# Patient Record
Sex: FEMALE | Race: WHITE
[De-identification: names, ages, dates, MRNs, and addresses within clinical notes are randomized per-mention and may not be internally consistent; named-entity substitution may affect disease eponyms.]

---

## 2019-03-03 ENCOUNTER — HOSPITAL ENCOUNTER (OUTPATIENT)
Dept: HOSPITAL 97 - ER | Age: 54
Setting detail: OBSERVATION
LOS: 1 days | Discharge: HOME | End: 2019-03-04
Attending: HOSPITALIST | Admitting: HOSPITALIST
Payer: COMMERCIAL

## 2019-03-03 VITALS — BODY MASS INDEX: 22.6 KG/M2

## 2019-03-03 DIAGNOSIS — J45.909: ICD-10-CM

## 2019-03-03 DIAGNOSIS — I10: ICD-10-CM

## 2019-03-03 DIAGNOSIS — K80.00: Primary | ICD-10-CM

## 2019-03-03 DIAGNOSIS — L40.9: ICD-10-CM

## 2019-03-03 PROCEDURE — 83690 ASSAY OF LIPASE: CPT

## 2019-03-03 PROCEDURE — 80076 HEPATIC FUNCTION PANEL: CPT

## 2019-03-03 PROCEDURE — 36415 COLL VENOUS BLD VENIPUNCTURE: CPT

## 2019-03-03 PROCEDURE — 85610 PROTHROMBIN TIME: CPT

## 2019-03-03 PROCEDURE — 84484 ASSAY OF TROPONIN QUANT: CPT

## 2019-03-03 PROCEDURE — 76705 ECHO EXAM OF ABDOMEN: CPT

## 2019-03-03 PROCEDURE — 85025 COMPLETE CBC W/AUTO DIFF WBC: CPT

## 2019-03-03 PROCEDURE — 84145 PROCALCITONIN (PCT): CPT

## 2019-03-03 PROCEDURE — 96375 TX/PRO/DX INJ NEW DRUG ADDON: CPT

## 2019-03-03 PROCEDURE — 84132 ASSAY OF SERUM POTASSIUM: CPT

## 2019-03-03 PROCEDURE — 80048 BASIC METABOLIC PNL TOTAL CA: CPT

## 2019-03-03 PROCEDURE — 85730 THROMBOPLASTIN TIME PARTIAL: CPT

## 2019-03-03 PROCEDURE — 74177 CT ABD & PELVIS W/CONTRAST: CPT

## 2019-03-03 PROCEDURE — 96361 HYDRATE IV INFUSION ADD-ON: CPT

## 2019-03-03 PROCEDURE — 81003 URINALYSIS AUTO W/O SCOPE: CPT

## 2019-03-03 PROCEDURE — 96365 THER/PROPH/DIAG IV INF INIT: CPT

## 2019-03-03 PROCEDURE — 71045 X-RAY EXAM CHEST 1 VIEW: CPT

## 2019-03-03 PROCEDURE — 99285 EMERGENCY DEPT VISIT HI MDM: CPT

## 2019-03-03 NOTE — XMS REPORT
Summary of Care: 17 - 17

 Created on:2102



Patient:WINDY RIZO

Sex:Female

:1965

External Reference #:[id number]





Demographics







 Address  [address]



   [place], [place] [address] Phone  [phone]

 

 Email Address  [email or web address]

 

 Preferred Language  Unknown

 

 Marital Status  

 

 Restorationism Affiliation  Unknown

 

 Race  White/

 

 Ethnic Group  Not  or 









Author







 Organization  [facility]

 

 Address  [address] [place], [place] [address]

 

 Phone  [phone]









Encounter

HQ Encntr_alias(FIN) [id number] Date(s): 17 - 17

[address] [phone]
[phone]

Discharge Disposition: Home or Self Care

Attending Physician: Cristian Torres MD

Referring Physician: Cristian Torres MD





Vital Signs

No data available for this section



Problem List

No data available for this section



Allergies, Adverse Reactions, Alerts

No data available for this section



Medications

No data available for this section



Results

No data available for this section



Immunizations

No data available for this section



Procedures

No data available for this section



Social History

No data available for this section



Assessment and Plan

No data available for this section

## 2019-03-03 NOTE — XMS REPORT
Continuity of Care Document

 Created on:2017



Patient:WINDY RIZO

Sex:Female

:1965

External Reference #:[id number]





Demographics







 Address  [address] [place] [address] 



   [place] [address]

 

 Phone  [phone]

 

 Preferred Language  Unknown

 

 Marital Status  Unknown

 

 Mormonism Affiliation  Unknown

 

 Race  Unknown

 

 Ethnic Group  Unknown









Author







 Organization  [facility]









Problems







 Problem  Status  Onset Date  Classification  Date  Comments  Source



         Reported    



             



             

 

 PAIN  Active  2017         [facility]



             



             



             







Medications







 Medication  Details  Route  Status  Patient  Ordering  Order  Source



         Instructions  Provider  Date  



               



               



               







Allergies, Adverse Reactions, Alerts







 Substance  Category  Reaction  Severity  Reaction  Status  Date  Comments  
Source



         type    Reported    



                 



                 







Immunizations







 Immunization  Date Given  Site  Status  Last Updated  Comments  Source



             



             







Results







 Order  Results  Value  Reference  Date  Interpretation  Comments  Source



 Name      Range        



               



               



               

 

 Chest 2  Chest 2  CHEST TWO  VIEW        -   [facility]



 views  views     -  



 DX              



               



     INDICATION: Clearance for the patient to begin Humira medication for 
psoriasis.        Read by:  Jair Means MD  



             Dictated Date/time:  17 11:54  



             Electronically Signed by:  Jari Means MD                
   17 11:56  



             FINAL REPORT  



     COMPARISON: None.          



               



               



               



     FINDINGS: The lungs are clear. The pleura, cardiomediastinal silhouette 
and bony thorax are normal.          



               



               



               



     IMPRESSION: Negative.          



               



               



               



               



               



     *******END IMPRESSION*******          



               



               



               



               



               



     SL: [id number]          



               



               



               



               







Vital Signs







 Vital Sign  Value  Date  Comments  Source



         







Encounters







 Location  Location  Encounter  Encounter  Reason  Attending  ADM  DC  Status  
Source



   Details  Type  Number  For  Provider  Date  Date    



         Visit          



                   



                   



                   

 

 [facility]    Outpatient  [id number]    Cristian Torres       [facility]



 [facility]    



 Gaurav                  



                   



                   



                   







Procedures







 Procedure  Code  Date  Perfomer  Comments  Source

## 2019-03-04 VITALS — SYSTOLIC BLOOD PRESSURE: 135 MMHG | DIASTOLIC BLOOD PRESSURE: 89 MMHG | TEMPERATURE: 98.9 F

## 2019-03-04 VITALS — OXYGEN SATURATION: 100 %

## 2019-03-04 LAB
ALBUMIN SERPL BCP-MCNC: 3.9 G/DL (ref 3.4–5)
ALP SERPL-CCNC: 66 U/L (ref 45–117)
ALT SERPL W P-5'-P-CCNC: 34 U/L (ref 12–78)
AST SERPL W P-5'-P-CCNC: 24 U/L (ref 15–37)
BUN BLD-MCNC: 14 MG/DL (ref 7–18)
GLUCOSE SERPLBLD-MCNC: 111 MG/DL (ref 74–106)
HCT VFR BLD CALC: 40.5 % (ref 36–45)
INR BLD: 0.99
LIPASE SERPL-CCNC: 116 U/L (ref 73–393)
LYMPHOCYTES # SPEC AUTO: 2.2 K/UL (ref 0.7–4.9)
PMV BLD: 9.1 FL (ref 7.6–11.3)
POTASSIUM SERPL-SCNC: 3.4 MMOL/L (ref 3.5–5.1)
RBC # BLD: 4.48 M/UL (ref 3.86–4.86)
TROPONIN (EMERG DEPT USE ONLY): < 0.02 NG/ML (ref 0–0.04)
UA DIPSTICK W REFLEX MICRO PNL UR: (no result)

## 2019-03-04 RX ADMIN — SODIUM CHLORIDE SCH: 0.9 INJECTION, SOLUTION INTRAVENOUS at 13:00

## 2019-03-04 RX ADMIN — POTASSIUM CHLORIDE SCH: 200 INJECTION, SOLUTION INTRAVENOUS at 10:00

## 2019-03-04 RX ADMIN — SODIUM CHLORIDE SCH MLS: 0.9 INJECTION, SOLUTION INTRAVENOUS at 03:43

## 2019-03-04 RX ADMIN — POTASSIUM CHLORIDE SCH: 200 INJECTION, SOLUTION INTRAVENOUS at 08:00

## 2019-03-04 NOTE — ER
Nurse's Notes                                                                                     

 [facility]                                                                

Name: Vanessa Louis                                                                             

Age: 53 yrs                                                                                       

Sex: Female                                                                                       

: 1965                                                                                   

MRN: [id number]                                                                                   

Arrival Date: 2019                                                                          

Time: 23:05                                                                                       

Account#: [id number]                                                                            

Bed 25                                                                                            

Private MD: Carter Brunson B                                                                     

Diagnosis: Abdominal tenderness;Vomiting;Hypokalemia;Essential (primary) hypertension             

                                                                                                  

Presentation:                                                                                     

                                                                                             

23:15 Presenting complaint: Patient states: she has abdominal pain/cramping radiating to the  mg2 

      back started this 2130H. she ate chicken blue in [facility]. she vomited once on     

      her way her to ed. Transition of care: patient was not received from another setting of     

      care. Onset of symptoms was 2019 at 21:30. Risk Assessment: Do you want to        

      hurt yourself or someone else? Patient reports no desire to harm self or others.            

      Initial Sepsis Screen: Does the patient meet any 2 criteria? No. Patient's initial          

      sepsis screen is negative. Does the patient have a suspected source of infection? No.       

      Patient's initial sepsis screen is negative. Care prior to arrival: None.                   

23:15 Method Of Arrival: Ambulatory                                                           mg2 

23:15 Acuity: JAXON 3                                                                           mg2 

                                                                                                  

Triage Assessment:                                                                                

23:22 General: Appears in no apparent distress. comfortable, Behavior is calm, cooperative.   mg2 

      Pain: Complains of pain in abdomen Pain radiates to back Pain currently is 9 out of 10      

      on a pain scale. Quality of pain is described as aching, crampy, Pain began gradually,      

      2 hours ago. Is intermittent. EENT: No deficits noted. Neuro: Level of Consciousness is     

      awake, alert, obeys commands, Oriented to person, place, time, situation.                   

      Cardiovascular: Capillary refill < 3 seconds Patient's skin is warm and dry.                

      Respiratory: Airway is patent Respiratory effort is even, unlabored, Respiratory            

      pattern is regular, symmetrical. GI: Abdomen is flat, non-distended, Reports lower          

      abdominal pain, upper abdominal pain, vomiting. : No signs and/or symptoms were           

      reported regarding the genitourinary system. Derm: Skin is intact, is healthy with good     

      turgor, Skin is pink, warm \T\ dry. normal. Musculoskeletal: No signs and/or symptoms       

      reported regarding the musculoskeletal system.                                              

                                                                                                  

OB/GYN:                                                                                           

23:19 LMP N/A - Post-menopause                                                                mg2 

                                                                                                  

Historical:                                                                                       

- Allergies:                                                                                      

23:20 No Known Allergies;                                                                     mg2 

- Home Meds:                                                                                      

23:20 losartan 50 mg oral tab 1 tab once daily [Active];                                      mg2 

- PMHx:                                                                                           

23:20 Hypertension;                                                                           mg2 

- PSHx:                                                                                           

23:20 Tonsillectomy; ;                                                               mg2 

                                                                                                  

- Immunization history:: Flu vaccine is not up to date.                                           

- Social history:: Smoking status: Patient/guardian denies using tobacco, Patient uses            

  alcohol, 2-3 bottles 5 times a week. Patient/guardian denies using street drugs, IV             

  drugs.                                                                                          

- Ebola Screening: : No symptoms or risks identified at this time.                                

                                                                                                  

                                                                                                  

Screenin:21 Abuse screen: Denies threats or abuse. Denies injuries from another. Nutritional        mg2 

      screening: No deficits noted. Tuberculosis screening: No symptoms or risk factors           

      identified. Fall Risk IV access (20 points).                                                

                                                                                                  

Assessment:                                                                                       

23:23 Reassessment: see triage assessment.                                                    mg2 

                                                                                             

01:47 Reassessment: patient sleeping on bed.                                                  mg2 

02:17 Reassessment: dr ramirez came and assessed the patient. advised for hospitalization.       mg2 

      patient agreed.                                                                             

02:18 GI: Bowel sounds present X 4 quads. Abd is soft X 4 quads.                              mg2 

02:53 Reassessment: patient sent to ct scan.                                                  mg2 

                                                                                                  

Vital Signs:                                                                                      

                                                                                             

23:19  / 55; Pulse 82; Resp 18; Temp 98.2; Pulse Ox 100% on R/A; Weight 63.5 kg; Height mg2 

      5 ft. 6 in. (167.64 cm); Pain 9/10;                                                         

                                                                                             

00:09  / 105; Pulse 72; Resp 19; Pulse Ox 100% on R/A;                                  ca1 

02:52  / 78; Pulse 78; Resp 18; Pulse Ox 100% on R/A; Pain 0/10;                        mg2 

                                                                                             

23:19 Body Mass Index 22.60 (63.50 kg, 167.64 cm)                                             mg2 

                                                                                                  

ED Course:                                                                                        

                                                                                             

23:05 Patient arrived in ED.                                                                  am2 

23:05 Carter Brunson MD is Private Physician.                                                am2 

23:19 Triage completed.                                                                       mg2 

23:20 Inserted saline lock: 20 gauge in left antecubital area, using aseptic technique. Blood ca1 

      collected.                                                                                  

23:21 Arm band placed on.                                                                     mg2 

23:23 No provider procedures requiring assistance completed.                                  mg2 

03/04                                                                                             

00:02 Dakotah Hilliard MD is Attending Physician.                                             ann 

00:03 Luis Crowley RN is Primary Nurse.                                                  mg2 

00:48 X-ray completed. Portable x-ray completed in exam room. Patient tolerated procedure     kw  

      well.                                                                                       

00:49 Chest Single View XRAY In Process Unspecified.                                          EDMS

01:57 Effie Solares MD is Hospitalizing Provider.                                           ann 

02:18 Patient has correct armband on for positive identification.                             mg2 

02:37 Patient admitted, IV remains in place.                                                  mg2 

02:46 Patient moved to CT via wheelchair.                                                     kw1 

03:04 CT completed. Patient tolerated procedure well. Patient moved back from CT.             kw1 

                                                                                                  

Administered Medications:                                                                         

03                                                                                             

23:32 CANCELLED (Duplicate Order): Zofran 2 mg IVP once; over 2 minutes                       ca1 

23:33 Drug: Zofran 4 mg Route: IVP; Site: left antecubital;                                   ca1 

03/04                                                                                             

00:46 Follow up: Response: No adverse reaction; Nausea is decreased                           mg2 

00:45 Drug: fentaNYL (PF) 25 mcg Route: IVP; Site: left antecubital;                          mg2 

01:40 Follow up: Response: No adverse reaction; Marked relief of symptoms                     mg2 

00:45 Drug: fentaNYL (PF) 25 mcg Route: IVP; Site: left antecubital;                          mg2 

01:40 Follow up: Response: No adverse reaction; Marked relief of symptoms                     mg2 

00:45 Drug: Zosyn 3.375 grams Route: IVPB; Infused Over: 60 mins; Site: left antecubital;     mg2 

01:40 Follow up: Response: No adverse reaction; IV Status: Completed infusion                 mg2 

00:46 Drug: NS 0.9% 1000 ml Route: IV; Rate: 1 bolus; Site: left antecubital;                 mg2 

01:41 Follow up: Response: No adverse reaction; IV Status: Completed infusion                 mg2 

00:46 Drug: Pepcid 20 mg Route: IVP; Site: left antecubital;                                  mg2 

01:40 Follow up: Response: No adverse reaction; Marked relief of symptoms                     mg2 

02:17 Drug: NS 0.9% with KCl 20 mEq/L 1000 ml Route: IV; Rate: 125 ml/hr; Site: left          mg2 

      antecubital;                                                                                

02:58 Follow up: Response: No adverse reaction; IV Status: Infusion continued upon admission  mg2 

                                                                                                  

                                                                                                  

Outcome:                                                                                          

01:58 Decision to Hospitalize by Provider.                                                    ann 

02:37 Admitted to [facility] by nurse, via wheelchair, room 427, with chart, Report     mg2 

      called to  ETHAN Conner                                                                        

02:37 Condition: stable                                                                           

02:37 Instructed on the need for admit, Demonstrated understanding of instructions.               

03:04 Patient left the ED.                                                                    tl1 

                                                                                                  

Signatures:                                                                                       

Dispatcher [employer]                           EDDakotah Meza MD MD cha Whitley, Kimberlee kw Lasagna, Tonya RN                      RN   tl1                                                  

Nisreen Velarde Kimberly                            kw1                                                  

Luis Crowley RN                    RN   mg2                                                  

Juliane Cervantes, RN                        RN   ca1                                                  

                                                                                                  

Corrections: (The following items were deleted from the chart)                                    

03                                                                                             

23:23 23:22 General: Appears mg2                                                              mg2 

                                                                                                  

**************************************************************************************************

## 2019-03-04 NOTE — P.DS
Admission Date: 03/04/19


Discharge Date: 03/04/19


Primary Care Provider: Dr. Brunson


Disposition: ROUTINE DISCHARGE


Discharge Condition: GOOD


Reason for Admission: Abdominal pain/acute cholecystitis


Consultations: 





Surgery-Dr. Coronado


Procedures: 





ABUS: 


COMPARISON:  March 4th CAT scan 


FINDINGS:  A large gallstone. Smaller gallstones are present. The gallbladder 

wall measures 4 millimeters. Small amount pericholecystic fluid 


The biliary tree is normal caliber. 


IMPRESSION:  Cholelithiasis with thickened wall and small amount of 

pericholecystic fluid consistent with cholecystitis.





Medical Problem List: 


Right upper quadrant abdominal pain, nausea and vomiting secondary to acute 

cholecystitis and cholelithiasis


Hypertension


Asthma


Psoriasis 


Brief History of Present Illness: 





53-year-old  female presented emergency room with right upper quadrant 

abdominal pain, nausea and vomiting.  Patient evaluated in the emergency room.  

Patient found to have cholecystitis with cholelithiasis.  Patient was admitted 

for further evaluation.


Hospital Course: 





Patient presented with right upper quadrant abdominal pain, nausea and vomiting 

secondary to acute cholecystitis and cholelithiasis.  Pro calcitonin negative.  

White count within normal range.  Patient was admitted for treatment.  Patient 

seen and evaluated by surgery.  No surgical intervention was required.  Patient 

did well in her stay.  At discharge she will continue with Flagyl 500 mg 1 pill 

3 times a day and Cipro 500 mg 1 pill twice daily for 7 days.  Recommend to 

follow up with surgery within 1 week.  Arrangements for outpatient 

cholecystectomy will be arranged as the patient will follow up with surgery.





Patient with hypertension.  Patient will continue with losartan 50 mg 1 pill 

daily.  Recommend to maintain blood pressures less 150/80.  Further adjustment 

can be done by her PCP.





Patient with asthma.  Patient will continue with her inhalers.  Further 

adjustment can be done by her PCP.





Patient with psoriasis.  Patient will continue with her medication.


Vital Signs/Physical Exam: 














Temp Pulse Resp BP Pulse Ox


 


 97.6 F   74   16   128/88   98 


 


 03/04/19 10:49  03/04/19 10:49  03/04/19 10:49  03/04/19 10:49  03/04/19 10:49








General: Alert, In no apparent distress, Oriented x3, Cooperative


HEENT: Atraumatic


Neck: Supple


Respiratory: Clear to auscultation bilaterally, Normal air movement


Cardiovascular: Normal pulses, Regular rate/rhythm


Gastrointestinal: Normal bowel sounds, Soft and benign, Non-distended, No 

ascites, No tenderness, No masses, No rebound, No guarding


Musculoskeletal: No erythema, No tenderness, No warmth


Integumentary: No tenderness/swelling, No erythema, No warmth, No cyanosis


Neurological: Normal speech, Normal strength at 5/5 x4 extr, Normal tone, 

Normal affect


Laboratory Data at Discharge: 














WBC  9.3 K/uL (4.3-10.9)   03/03/19  23:20    


 


Hgb  13.6 g/dL (12.0-15.0)   03/03/19  23:20    


 


Hct  40.5 % (36.0-45.0)   03/03/19  23:20    


 


Plt Count  280 K/uL (152-406)   03/03/19  23:20    


 


PT  11.7 SECONDS (9.5-12.5)   03/04/19  00:00    


 


INR  0.99   03/04/19  00:00    


 


APTT  31.1 SECONDS (24.3-36.9)   03/04/19  00:00    


 


Sodium  140 mmol/L (136-145)   03/03/19  23:20    


 


Potassium  3.9 mmol/L (3.5-5.1)   03/04/19  08:08    


 


BUN  14 mg/dL (7-18)   03/03/19  23:20    


 


Creatinine  0.85 mg/dL (0.55-1.3)   03/03/19  23:20    


 


Glucose  111 mg/dL ()  H  03/03/19  23:20    


 


Total Bilirubin  0.4 mg/dL (0.2-1.0)   03/03/19  23:20    


 


AST  24 U/L (15-37)   03/03/19  23:20    


 


ALT  34 U/L (12-78)   03/03/19  23:20    


 


Alkaline Phosphatase  66 U/L ()   03/03/19  23:20    


 


Lipase  116 U/L ()   03/03/19  23:20    








Home Medications: 








Albuterol Sulfate [Proair Hfa] 8.5 gm IH BIDP PRN 03/04/19 


Ciprofloxacin HCl [Cipro 500 MG Tablet] 500 mg PO BID #14 tab 03/04/19 


Guselkumab [Tremfya] 100 mg SQ SEECOM 03/04/19 


Losartan Potassium [Cozaar*] 50 mg PO DAILY 03/04/19 


metroNIDAZOLE [Flagyl] 500 mg PO Q8H #21 tablet 03/04/19 





New Medications: 


Ciprofloxacin HCl [Cipro 500 MG Tablet] 500 mg PO BID #14 tab


metroNIDAZOLE [Flagyl] 500 mg PO Q8H #21 tablet


Patient Discharge Instructions: 1.  Patient will follow up with surgery within 

1 week.  2.  Patient presented with right upper quadrant abdominal pain, nausea 

and vomiting secondary to acute cholecystitis and cholelithiasis.  Pro 

calcitonin negative.  White count within normal range.  Patient was admitted 

for treatment.  Patient seen and evaluated by surgery.  No surgical 

intervention was required.  Patient did well in her stay.  At discharge she 

will continue with Flagyl 500 mg 1 pill 3 times a day and Cipro 500 mg 1 pill 

twice daily for 7 days.  Recommend to follow up with surgery within 1 week.  

Arrangements for outpatient cholecystectomy will be arranged as the patient 

will follow up with surgery.  3.  Patient with hypertension.  Patient will 

continue with losartan 50 mg 1 pill daily.  Recommend to maintain blood 

pressures less 150/80.  Further adjustment can be done by her PCP.  4.  Patient 

with asthma.  Patient will continue with her inhalers.  Further adjustment can 

be done by her PCP.  5.  Patient with psoriasis.  Patient will continue with 

her medication.


Diet: GI soft diet


Activity: Ad [place]lotte


Time spent managing pt's care (in minutes): 55

## 2019-03-04 NOTE — EDPHYS
Physician Documentation                                                                           

 [facility]                                                                

Name: Vanessa Louis                                                                             

Age: 53 yrs                                                                                       

Sex: Female                                                                                       

: 1965                                                                                   

MRN: [id number]                                                                                   

Arrival Date: 2019                                                                          

Time: 23:05                                                                                       

Account#: [id number]                                                                            

Bed 25                                                                                            

Private MD: Carter Brunson B                                                                     

ED Physician Dakotah Hilliard                                                                      

HPI:                                                                                              

                                                                                             

00:31 This 53 yrs old  Female presents to ER via Ambulatory with complaints of       ann 

      Abdominal Distention, Abdominal Pain, Abdominal Cramping, Back Pain.                        

                                                                                                  

OB/GYN:                                                                                           

                                                                                             

23:19 LMP N/A - Post-menopause                                                                mg2 

                                                                                                  

Historical:                                                                                       

- Allergies:                                                                                      

23:20 No Known Allergies;                                                                     mg2 

- Home Meds:                                                                                      

23:20 losartan 50 mg oral tab 1 tab once daily [Active];                                      mg2 

- PMHx:                                                                                           

23:20 Hypertension;                                                                           mg2 

- PSHx:                                                                                           

23:20 Tonsillectomy; ;                                                               mg2 

                                                                                                  

- Immunization history:: Flu vaccine is not up to date.                                           

- Social history:: Smoking status: Patient/guardian denies using tobacco, Patient uses            

  alcohol, 2-3 bottles 5 times a week. Patient/guardian denies using street drugs, IV             

  drugs.                                                                                          

- Ebola Screening: : No symptoms or risks identified at this time.                                

                                                                                                  

                                                                                                  

ROS:                                                                                              

                                                                                             

00:31 Constitutional: Negative for fever, chills, and weight loss, Eyes: Negative for injury, ann 

      pain, redness, and discharge, ENT: Negative for injury, pain, and discharge, Neck:          

      Negative for injury, pain, and swelling, Cardiovascular: Negative for chest pain,           

      palpitations, and edema, Respiratory: Negative for shortness of breath, cough,              

      wheezing, and pleuritic chest pain, Back: Negative for injury and pain, : Negative        

      for injury, bleeding, discharge, and swelling, MS/Extremity: Negative for injury and        

      deformity, Skin: Negative for injury, rash, and discoloration, Neuro: Negative for          

      headache, weakness, numbness, tingling, and seizure, Psych: Negative for depression,        

      anxiety, suicide ideation, homicidal ideation, and hallucinations, Allergy/Immunology:      

      Negative for hives, rash, and allergies, Endocrine: Negative for neck swelling,             

      polydipsia, polyuria, polyphagia, and marked weight changes, Hematologic/Lymphatic:         

      Negative for swollen nodes, abnormal bleeding, and unusual bruising.                        

      Abdomen/GI: Positive for abdominal pain, abdominal distension, of the epigastric area       

      and right upper quadrant.                                                                   

                                                                                                  

Exam:                                                                                             

00:31 Constitutional:  This is a well developed, well nourished patient who is awake, alert,  ann 

      and in no acute distress. Head/Face:  Normocephalic, atraumatic. Eyes:  Pupils equal        

      round and reactive to light, extra-ocular motions intact.  Lids and lashes normal.          

      Conjunctiva and sclera are non-icteric and not injected.  Cornea within normal limits.      

      Periorbital areas with no swelling, redness, or edema. ENT:  Nares patent. No nasal         

      discharge, no septal abnormalities noted.  Tympanic membranes are normal and external       

      auditory canals are clear.  Oropharynx with no redness, swelling, or masses, exudates,      

      or evidence of obstruction, uvula midline.  Mucous membranes moist. Neck:  Trachea          

      midline, no thyromegaly or masses palpated, and no cervical lymphadenopathy.  Supple,       

      full range of motion without nuchal rigidity, or vertebral point tenderness.  No            

      Meningismus. Chest/axilla:  Normal chest wall appearance and motion.  Nontender with no     

      deformity.  No lesions are appreciated. Cardiovascular:  Regular rate and rhythm with a     

      normal S1 and S2.  No gallops, murmurs, or rubs.  Normal PMI, no JVD.  No pulse             

      deficits. Respiratory:  Lungs have equal breath sounds bilaterally, clear to                

      auscultation and percussion.  No rales, rhonchi or wheezes noted.  No increased work of     

      breathing, no retractions or nasal flaring. Back:  No spinal tenderness.  No                

      costovertebral tenderness.  Full range of motion. Skin:  Warm, dry with normal turgor.      

      Normal color with no rashes, no lesions, and no evidence of cellulitis. MS/ Extremity:      

      Pulses equal, no cyanosis.  Neurovascular intact.  Full, normal range of motion. Neuro:     

       Awake and alert, GCS 15, oriented to person, place, time, and situation.  Cranial          

      nerves II-XII grossly intact.  Motor strength 5/5 in all extremities.  Sensory grossly      

      intact.  Cerebellar exam normal.  Normal gait. Psych:  Awake, alert, with orientation       

      to person, place and time.  Behavior, mood, and affect are within normal limits.            

00:31 Abdomen/GI: Inspection: abdomen appears normal, Bowel sounds: normal, in the epigastric     

      area and right upper quadrant, Palpation: mild abdominal tenderness, moderate abdominal     

      tenderness, in the epigastric area and right upper quadrant, Liver: no appreciated          

      palpable abnormalities, Hernia: not appreciated.                                            

                                                                                                  

Vital Signs:                                                                                      

                                                                                             

23:19  / 55; Pulse 82; Resp 18; Temp 98.2; Pulse Ox 100% on R/A; Weight 63.5 kg; Height mg2 

      5 ft. 6 in. (167.64 cm); Pain 9/10;                                                         

                                                                                             

00:09  / 105; Pulse 72; Resp 19; Pulse Ox 100% on R/A;                                  ca1 

02:52  / 78; Pulse 78; Resp 18; Pulse Ox 100% on R/A; Pain 0/10;                        mg2 

                                                                                             

23:19 Body Mass Index 22.60 (63.50 kg, 167.64 cm)                                             mg2 

                                                                                                  

MDM:                                                                                              

00:02 Patient medically screened.                                                             [facility] 

00:33 Data reviewed: vital signs, nurses notes, lab test result(s), EKG, radiologic studies,  [facility] 

      CT scan, plain films.                                                                       

                                                                                                  

                                                                                             

23:15 Order name: Basic Metabolic Panel; Complete Time: 01:52                                 [facility] [place] 

                                                                                             

23:15 Order name: CBC with Diff; Complete Time: 00:28                                         [facility] [place] 

                                                                                             

23:15 Order name: Creatinine for Radiology; Complete Time: 00:28                              [facility] [place] 

                                                                                             

23:15 Order name: Hepatic Function; Complete Time: 01:52                                      [facility] [place] 

                                                                                             

23:15 Order name: Lipase; Complete Time: 01:52                                                [facility] [place] 

                                                                                             

00:31 Order name: PT-INR; Complete Time: 01:52                                                ann 

                                                                                             

00:31 Order name: Ptt, Activated; Complete Time: 01:52                                        [facility] 

                                                                                             

00:44 Order name: Troponin (Emerg Dept Use Only); Complete Time: 01:52                        EDMS

                                                                                             

02:26 Order name: Urinalysis                                                                  EDMS

                                                                                             

02:26 Order name: CBC with Automated Diff                                                     EDMS

                                                                                             

02:26 Order name: CBC with Automated Diff                                                     EDMS

                                                                                             

02:26 Order name: Comprehensive Metabolic Panel                                               EDMS

                                                                                             

02:26 Order name: Comprehensive Metabolic Panel                                               EDMS

                                                                                             

00:31 Order name: Chest Single View XRAY                                                      [facility] 

                                                                                             

00:31 Order name: CT Abd/Pelvis - W/Contrast                                                  [facility] 

                                                                                             

02:02 Order name: US Abdomen Limited                                                          [facility] 

                                                                                             

02:26 Order name: NPO                                                                         EDMS

03/04                                                                                             

02:26 Order name: Magnesium                                                                   EDMS

                                                                                             

02:26 Order name: Magnesium                                                                   EDMS

                                                                                             

02:26 Order name: Phosphorus                                                                  EDMS

                                                                                             

02:26 Order name: Phosphorus                                                                  EDMS

                                                                                             

23:15 Order name: IV Saline Lock; Complete Time: 23:27                                        mg2 

                                                                                             

23:15 Order name: Labs collected and sent; Complete Time: 23:27                               mg2 

                                                                                                  

Administered Medications:                                                                         

                                                                                             

23:32 CANCELLED (Duplicate Order): Zofran 2 mg IVP once; over 2 minutes                       ca1 

23:33 Drug: Zofran 4 mg Route: IVP; Site: left antecubital;                                   ca1 

                                                                                             

00:46 Follow up: Response: No adverse reaction; Nausea is decreased                           mg2 

00:45 Drug: fentaNYL (PF) 25 mcg Route: IVP; Site: left antecubital;                          mg2 

01:40 Follow up: Response: No adverse reaction; Marked relief of symptoms                     mg2 

00:45 Drug: fentaNYL (PF) 25 mcg Route: IVP; Site: left antecubital;                          mg2 

01:40 Follow up: Response: No adverse reaction; Marked relief of symptoms                     mg2 

00:45 Drug: Zosyn 3.375 grams Route: IVPB; Infused Over: 60 mins; Site: left antecubital;     mg2 

01:40 Follow up: Response: No adverse reaction; IV Status: Completed infusion                 mg2 

00:46 Drug: NS 0.9% 1000 ml Route: IV; Rate: 1 bolus; Site: left antecubital;                 mg2 

01:41 Follow up: Response: No adverse reaction; IV Status: Completed infusion                 mg2 

00:46 Drug: Pepcid 20 mg Route: IVP; Site: left antecubital;                                  mg2 

01:40 Follow up: Response: No adverse reaction; Marked relief of symptoms                     mg2 

02:17 Drug: NS 0.9% with KCl 20 mEq/L 1000 ml Route: IV; Rate: 125 ml/hr; Site: left          mg2 

      antecubital;                                                                                

02:58 Follow up: Response: No adverse reaction; IV Status: Infusion continued upon admission  mg2 

                                                                                                  

                                                                                                  

Disposition:                                                                                      

19 01:58 Hospitalization ordered by Effie Solares for Observation. Preliminary             

  diagnosis are Abdominal tenderness, Vomiting, Hypokalemia, Essential                            

  (primary) hypertension.                                                                         

- Bed requested for Telemetry/MedSurg (observation).                                              

- Status is Observation.                                                                      tl1 

- Condition is Stable.                                                                            

- Problem is new.                                                                                 

- Symptoms have improved.                                                                         

UTI on Admission? No                                                                              

                                                                                                  

                                                                                                  

                                                                                                  

Signatures:                                                                                       

Dispatcher [employer]                           EDMS                                                 

Dakotah Hilliard MD MD cha Lasagna, Tonya, RN                      RN   tl1                                                  

Emerald Dong                             mt                                                   

Luis Crowley RN                    RN   mg2                                                  

Juliane Cervantes RN                        RN   ca1                                                  

                                                                                                  

Corrections: (The following items were deleted from the chart)                                    

                                                                                             

23:32 23:27 Zofran 2 mg IVP once; over 2 minutes ordered. ca1                                 ca1 

                                                                                             

00:43 00:31 TROPONIN (EMERG DEPT USE ONLY)+[id number].[id number] ordered. [facility]                            EDMS

02:18 01:58 Hospitalization Ordered by Effie Solares MD for Observation. Preliminary          mt  

      diagnosis is Abdominal tenderness; Vomiting; Hypokalemia; Essential (primary)               

      hypertension. Bed requested for Telemetry/MedSurg (observation). Status is Observation.     

      Condition is Stable. Problem is new. Symptoms have improved. UTI on Admission? No. [facility]      

03:04 02:18 2019 01:58 Hospitalization Ordered by Effie Solares MD for Observation.     tl1 

      Preliminary diagnosis is Abdominal tenderness; Vomiting; Hypokalemia; Essential             

      (primary) hypertension. Bed requested for Telemetry/MedSurg (observation). Status is        

      Observation. Condition is Stable. Problem is new. Symptoms have improved. UTI on            

      Admission? No. mt                                                                           

                                                                                                  

**************************************************************************************************

## 2019-03-04 NOTE — RAD REPORT
EXAM DESCRIPTION:  Randal Single View3/4/2019 12:51 am

 

CLINICAL HISTORY:   abdominal pain

 

COMPARISON:  none

 

FINDINGS:   The lungs appear clear of acute infiltrate. The heart is normal size

 

IMPRESSION:   No acute abnormalities displayed

## 2019-03-04 NOTE — P.HP
Certification for Inpatient


Patient admitted to: Inpatient


With expected LOS: >2 Midnights


Patient will require the following post-hospital care: None


Practitioner: I am a practitioner with admitting privileges, knowledge of 

patient current condition, hospital course, and medical plan of care.


Services: Services provided to patient in accordance with Admission 

requirements found in Title 42 Section 412.3 of the Code of Federal Regulations





Patient History


Date of Service: 03/04/19


Reason for admission: Abdominal pain/acute cholecystitis


History of Present Illness: 





Patient is a 53-year-old female came to the hospital with abdominal discomfort.

  Pain was mainly in the right upper quadrant.  Patient had an episode of 

nausea and vomiting.  Patient workup in the ER revealed acute cholecystitis 

with cholelithiasis.  Patient will be admitted to the hospital with General 

surgery consultation.  Will also get an abdominal ultrasound start on IV 

antibiotics.  Pain control as well.


Allergies





No Known Allergies Allergy (Verified 03/04/19 04:34)


 





Home Medications: 








Albuterol Sulfate [Proair Hfa] 8.5 gm IH BIDP PRN 03/04/19 


Guselkumab [Tremfya] 100 mg SQ SEECOM 03/04/19 


Losartan Potassium [Cozaar] 50 mg PO DAILY 03/04/19 








- Past Medical/Surgical History


Has patient received pneumonia vaccine in the past: No


Diabetic: No


-: hypertension


-: asthma


-: c section





- Family History


  ** Father


Medical History: Hypertension





  ** Mother


Medical History: Hypertension





- Social History


Smoking Status: Never smoker


Alcohol use: Yes


Caffeine use: Yes


Place of Residence: Home





Review of Systems


10-point ROS is otherwise unremarkable





Physical Examination





- Vital Signs


Temperature: 97.6 F


Blood Pressure: 128/88


Pulse: 74


Respirations: 16


Pulse Ox (%): 98





- Physical Exam


General: Alert, In no apparent distress, Oriented x3


HEENT: Atraumatic, PERRLA, Mucous membr. moist/pink, EOMI, Sclerae nonicteric


Neck: Supple, 2+ carotid pulse no bruit, No LAD, Without JVD or thyroid 

abnormality


Respiratory: Clear to auscultation bilaterally, Normal air movement


Cardiovascular: Regular rate/rhythm, Normal S1 S2, No murmurs


Gastrointestinal: Normal bowel sounds, Soft and benign, Non-distended, 

Tenderness, Rebound


Musculoskeletal: No clubbing, No swelling, No tenderness


Integumentary: No rashes


Neurological: Normal gait, Normal speech, Normal strength at 5/5 x4 extr, 

Normal tone, Sensation intact, Cranial nerves 3-12 intact, Normal affect


Lymphatics: No axilla or inguinal lymphadenopathy





- Studies


Laboratory Data (last 24 hrs)





03/04/19 00:00: PT 11.7, INR 0.99, APTT 31.1


03/03/19 23:20: Creatinine 0.84


03/03/19 23:20: WBC 9.3, Hgb 13.6, Hct 40.5, Plt Count 280


03/03/19 23:20: Sodium 140, Potassium 3.4 L, BUN 14, Creatinine 0.85, Glucose 

111 H, Total Bilirubin 0.4, AST 24, ALT 34, Alkaline Phosphatase 66, Lipase 116








Assessment & Plan





- Problems (Diagnosis)


(1) Acute cholecystitis due to biliary calculus


Current Visit: Yes   Status: Acute   





- Plan





1. Continue with IV hydration 


2. Continue with IV antibiotics


3. Continue with pain control


4. NPO


5. General surgery consultation; abdominal ultrasound pending


6. Serial H&H, and we will monitor CBC, BMP, LFTs and lipase along with 

electrolytes.


7. GI and DVT prophylaxis


Discharge Plan: Home


Plan to discharge in: Greater than 2 days





- Advance Directives


Does patient have a Living Will: No


Does patient have a Durable POA for Healthcare: No





- Code Status/Comfort Care


Code Status Assessed: Yes


Code Status: Full Code


Critical Care: No


Time Spent Managing PTS Care (In Minutes): 50

## 2019-03-04 NOTE — CON
Date of Consultation:  2019



Brief History Of Present Illness:  The patient is a 53-year-old  female, who came to the \[facility]\"" with abdominal discomfort beginning yesterday, it was mainly in the epigastric area in a band-l
radha distribution, but localized to the right upper quadrant and epigastric region.  She had an episod
e of nausea and vomiting.  She had somewhat similar episodes before in the past.  She had an EGD and 
was told she had a hiatal hernia and gastritis before in the past, was placed on high dose acid suppr
ession with 40 mg p.o. b.i.d. by her description by Dr. Hair.  She did get symptomatic improvement 
with that, but these episodes felt somewhat similar, although they localized to the right upper quadr
ant, which was a somewhat atypical presentation for her and as such, she came to the emergency room.



Past Medical History:  Significant for hypertension, asthma, .



Past Surgical History:  She has had D and C, , abdominoplasty.



Allergies:  ONLY SEASONAL ALLERGIES.  NO KNOWN DRUG ALLERGIES.



Medications:  At home include, ProAir, Tremfya, and Cozaar.



Family History:  Father had hypertension and mother had hypertension.



Social History:  Smoking, she denies smoking.  She drinks 2 bottles of wine per week.  Denies any rec
reational drug use.



Review of Systems:

A 10-point review of systems other than HPI, denies.



Physical Examination:

Vital Signs:  At the time of my examination, her BMI is 22.6.  Her blood pressure 128/88, pulse 74, r
espiratory rate 16, temperature 97.6. 

General:  She is awake, alert, and oriented. 

Psychiatric:  She is appropriate and conversive. 

HEENT:  She is normocephalic.  Sclerae anicteric.  Mucous is moist.  Oropharynx is clear. 

Neck:  Supple.  No JVD. 

Chest: Normal expansion and excursion. 

Cardiovascular:  Regular rate and rhythm. 

Pulmonary:  Clear to auscultation bilaterally. 

Abdomen:  Soft, nontender, nondistended.  No rebound.  No guarding.  No focal peritonitis.  Negative 
Millan sign.  She has well-healed surgical scars from her abdominoplasty. 

Extremities:  No clubbing, cyanosis, or edema. 

Skin:  Warm and dry.



Laboratory Data:  Reveals white blood cell count of 9.3, hemoglobin is 13.6, hematocrit of 40.5, plat
elet count is 280.  Her PT was 11.7, INR 0.9, PTT is 31.1.  Sodium 140, potassium 3.9, chloride 104, 
carbon dioxide 29, BUN 14, creatinine 0.8, glucose is 111, calcium 8.6, total bilirubin 0.4, direct c
omponent 0.1, AST 24, ALT 34, alkaline phosphatase is 66.  Troponin was 0.02.  Lipase was 116.  Proca
lcitonin less than 0.05.  She had a CT scan performed of the abdomen and pelvis as well as an abdomin
al ultrasound.  The abdominal ultrasound was officially read as cholelithiasis with thickened wall an
d small amount of pericholecystic fluid consistent with cholecystitis.  The gallbladder wall measures
 4 mm.  Small amount of pericholecystic fluid and large gallstone.  Biliary tree is normal in caliber
.  Her CT scan was officially read by the [facility] radiologist as colonic diverticula are present.  
Appendix appears unremarkable.  A 3 cm heterogeneous lesion involving the uterine fundus suggestive o
f fibroids.  Small nabothian cysts are present.  Gallbladder has gallstones within the gallbladder ar
e present.  Gallbladder feels mildly thickened.  No evidence of biliary tree dilatation.



Assessment And Plan:  This is a 53-year-old female who comes in with signs and symptoms of acute calc
ulous cholecystitis.

1.IV fluid hydration.

2.Antibiotic coverage.

3.I have explained risks, benefits, and alternatives of laparoscopic, possible open cholecystectomy 
including but not limited to bleeding, infection, damage to the surrounding tissues, injury to bile d
ucts and intestines.  The patient stated that she would like to schedule this as an outpatient as she
 is currently completely asymptomatic.  She would like to leave as she states she has plans for sprin
g break and would like to schedule her surgery as an outpatient following that.  I have explained the
 dietary plan, risks, benefits, and alternatives of the above stated plan and she will be discharged 
if able to tolerate p.o. diet with instructions to follow up as soon as possible to schedule for elec
tive cholecystectomy.  I have instructed her to return to the ER with any resumption of symptoms and 
that the risks of the cholecystitis attack including the infectious nature which she has and the need
 to come to the ER immediately upon any return of symptoms or new symptoms.  She displayed understand
ing of above stated plan and agree to proceed as indicated.





TK/SHILPA

DD:  2019 10:58:07Voice ID:  [id number]

DT:  2019 14:21:12Report ID:  [id number]

## 2019-03-04 NOTE — RAD REPORT
EXAM DESCRIPTION:  CT - Abdomen   Pelvis W Contrast - 3/4/2019 4:34 am

 

COMPARISON:  None

 

CLINICAL HISTORY:  Abdominal pain

 

TECHNIQUE:  Multiple helical axial images were obtained through the abdomen and pelvis using intraven
ous contrast. Coronal and sagittal reformatted images were obtained.

All CT scans at this facility use dose modulation, iterative reconstruction, and/or weight-based dosi
ng when appropriate to reduce radiation dose to as low as reasonably achievable.

 

FINDINGS:  Lung bases: A small hiatal hernia is present. Small calcified granuloma in the left lower 
lobe is present.

 

Liver: Homogenous attenuation is noted.

 

Gallbladder/biliary: Gallstones within the gallbladder are present. Gallbladder wall appears mildly t
hickened. No evidence of biliary ductal dilatation.

 

Pancreas: Unremarkable.   No evidence of ductal enlargement.

 

Spleen: Appears unremarkable. No splenomegaly.

 

Adrenals: Unremarkable.

 

Kidneys and ureters: No evidence of hydronephrosis.   Normal enhancement.

 

Bladder: Unremarkable.

 

Pelvic organs: A 3 cm mildly heterogeneous lesion involving the uterine fundus is present suggestive 
of a fibroid. Small nabothian cysts are present.

 

Bowel: Colonic diverticula are present. No evidence of bowel obstruction.   No bowel wall thickening.
 Appendix appears unremarkable.

 

Vasculature: Minimal aortic atherosclerosis is present.

 

Peritoneum: No free air. No significant free fluid.

 

Lymph nodes: Unremarkable.

 

Soft tissues: Unremarkable.

 

Bones: Degenerative changes of the lumbar spine noted.

 

IMPRESSION:  1. Cholelithiasis with mildly thickened appearance of the gallbladder wall, correlate cl
inically for acute cholecystitis. Ultrasound can be obtained for follow-up as appropriate.

2. Small uterine fibroid.

3. Colonic diverticulosis.

 

Electronically signed by:   Jayme Onofre MD   3/4/2019 3:37 AM CST Workstation: [id number]

 

Due to temporary technical issues with the PACS/Fluency reporting system, reports are being signed by
 the in house radiologist as a courtesy to ensure prompt reporting. The interpreting radiologist is f
ully responsible for the content of the report.

## 2019-03-04 NOTE — RAD REPORT
EXAM DESCRIPTION:  US - Abdomen Exam Limited - 3/4/2019 7:57 am

 

CLINICAL HISTORY:  Abdominal pain.

 

COMPARISON:  March 4th CAT scan

 

FINDINGS:  A large gallstone. Smaller gallstones are present. The gallbladder wall measures 4 millime
ters. Small amount pericholecystic fluid

 

The biliary tree is normal caliber.

 

IMPRESSION:  Cholelithiasis with thickened wall and small amount of pericholecystic fluid consistent 
with cholecystitis.